# Patient Record
Sex: MALE | Race: ASIAN | NOT HISPANIC OR LATINO | URBAN - METROPOLITAN AREA
[De-identification: names, ages, dates, MRNs, and addresses within clinical notes are randomized per-mention and may not be internally consistent; named-entity substitution may affect disease eponyms.]

---

## 2022-07-28 VITALS
HEART RATE: 50 BPM | DIASTOLIC BLOOD PRESSURE: 81 MMHG | OXYGEN SATURATION: 97 % | WEIGHT: 179.9 LBS | SYSTOLIC BLOOD PRESSURE: 130 MMHG | HEIGHT: 67 IN | RESPIRATION RATE: 16 BRPM | TEMPERATURE: 98 F

## 2022-07-28 RX ORDER — CHLORHEXIDINE GLUCONATE 213 G/1000ML
1 SOLUTION TOPICAL ONCE
Refills: 0 | Status: DISCONTINUED | OUTPATIENT
Start: 2022-08-03 | End: 2022-08-17

## 2022-07-28 NOTE — H&P ADULT - ASSESSMENT
62Y M, __ speaking, former smoker, w/ PMHx HTN, HLD, CAD s/p PTCA pLAD 5/14, SSS w/ good chronotropic response (no PPM) and PVD,  presents to [facility] for recommended cardiac cath w/ possible intervention if clinically indicated, in light of pts risk factors, CCS class III anginal symptoms and abnormal NST.    -ASA 3, Mallampati 3  -Pt compliant w/ home ASA 81mg, last dose ____. Load w/ Plavix 600mg prior to cath  -Pre cath IVF Hydration: NS 250cc bolus then c/w maintenance NS @ 75cc/hr  -Pre cath consented    Risks & benefits of procedure and alternative therapy have been explained to the patient including but not limited to: allergic reaction, bleeding w/possible need for blood transfusion, infection, renal and vascular compromise, limb damage, arrhythmia, stroke, vessel dissection/perforation, Myocardial infarction, emergent CABG. Informed consent obtained and in chart.  62Y M, former smoker, w/ PMHx HTN, HLD, CAD s/p PTCA pLAD 5/14, SSS w/ good chronotropic response (no PPM) and PVD,  presents to [facility] for recommended cardiac cath w/ possible intervention if clinically indicated, in light of pts risk factors, CCS class III anginal symptoms and abnormal NST.    -ASA 3, Mallampati 3  -Pt compliant w/ home ASA 81mg, last dose yesterday. Will give ASA 81mg and load w/ Plavix 600mg prior to cath  -Pre cath IVF Hydration: NS 250cc bolus then c/w maintenance NS @ 75cc/hr  -Pre cath consented    Risks & benefits of procedure and alternative therapy have been explained to the patient including but not limited to: allergic reaction, bleeding w/possible need for blood transfusion, infection, renal and vascular compromise, limb damage, arrhythmia, stroke, vessel dissection/perforation, Myocardial infarction, emergent CABG. Informed consent obtained and in chart.

## 2022-07-28 NOTE — H&P ADULT - NSHPLABSRESULTS_GEN_ALL_CORE
15.5   4.80  )-----------( 186      ( 03 Aug 2022 08:08 )             46.4       08-03    141  |  104  |  18  ----------------------------<  100<H>  4.0   |  26  |  1.22    Ca    9.4      03 Aug 2022 08:01    TPro  8.0  /  Alb  5.0  /  TBili  0.7  /  DBili  x   /  AST  34  /  ALT  34  /  AlkPhos  67  08-03      PT/INR - ( 03 Aug 2022 08:08 )   PT: 11.7 sec;   INR: 0.98          PTT - ( 03 Aug 2022 08:08 )  PTT:31.6 sec    CARDIAC MARKERS ( 03 Aug 2022 08:01 )  x     / x     / 127 U/L / x     / 2.6 ng/mL

## 2022-07-28 NOTE — H&P ADULT - HISTORY OF PRESENT ILLNESS
[facility]     Cardiologist: Dr. Juárez   Escort: To be determined on arrival   Pharmacy:   COVID-19:     CONFIRM MEDICATIONS UPON ARRIVAL     Patient is a 61y/o  M, former smoker, with PMHx of HTN, HLD, CAD (s/p PTCA 90% pLAD 5/14), sick sinus syndrome (no ppm - has good chronotropic response as per MD note), bilateral PVD with claudication, who presented to his cardiologist, Dr. Xie, with complaints of intermittent, non-radiating, substernal chest pain, that has been intermittent, but gradually worsening over the past few weeks, occurring with moderate exertion (walking less than 2 blocks or climbing less than 1 flight of stairs), and alleviated with rest. Patient denies SOB, palpitations, fatigue, dizziness, headache, chills, orthopnea, LE edema, cough, PND, recent travel, or sick contacts. ECHO 04/14/22: Mildly dilated LA. Normal LV/RV/RA. Grade I DD. Trace to mild MR/Tr. EF: 60%.   NST 07/17/22: Abnormal myocardial perfusion imaging, showing a small amount of ischemia in the inferior location, which was reversible in the rest images.     In light of patient's risk factors, CCS Class III anginal symptoms, and abnormal NST, patient presents for left heart cardiac catheterization with possible intervention if clinically indicated.  COVID: negative 7/31/22 (in chart)  Escort:  Pharmacy:     62Y M, __ speaking, former smoker, w/ PMHx HTN, HLD, CAD s/p PTCA pLAD 5/14, SSS w/ good chronotropic response (no PPM) and PVD, initially presented to cardiologist c/o intermittent, non-radiating, substernal CP, occurring w/ mod exertion and alleviated with rest, but gradually worsening over the past few weeks, He denies SOB, palpitations, fatigue, dizziness, headache, chills, orthopnea, LE edema, cough, PND, recent travel, or sick contacts.    TTE 4/14/22: LVEF 60%, G1DD, Mildly dilated LA, trace-mild MR/TR.  NST 7/17/22: Abnormal myocardial perfusion imaging, showing a small amount of ischemia in the inferior location, which was reversible in the rest images.     In light of patient's risk factors, CCS Class III anginal symptoms, and abnormal NST, patient presents for left heart cardiac catheterization with possible intervention if clinically indicated.  COVID: negative 7/31/22 (in chart)  Escort: [facility]  Pharmacy:     62Y M, former smoker, w/ PMHx HTN, HLD, CAD s/p PTCA pLAD 5/14, SSS w/ good chronotropic response (no PPM) and PVD, initially presented to cardiologist c/o intermittent, non-radiating, substernal CP, occurring w/ mod exertion and alleviated with rest, but gradually worsening over the past few weeks, He denies SOB, palpitations, fatigue, dizziness, headache, chills, orthopnea, LE edema, cough, PND, recent travel, or sick contacts.    TTE 4/14/22: LVEF 60%, G1DD, Mildly dilated LA, trace-mild MR/TR.  NST 7/17/22: Abnormal myocardial perfusion imaging, showing a small amount of ischemia in the inferior location, which was reversible in the rest images.     In light of patient's risk factors, CCS Class III anginal symptoms, and abnormal NST, patient presents for left heart cardiac catheterization with possible intervention if clinically indicated.

## 2022-07-28 NOTE — H&P ADULT - NSICDXPASTMEDICALHX_GEN_ALL_CORE_FT
PAST MEDICAL HISTORY:  CAD (coronary artery disease)     H/O sick sinus syndrome     Hyperlipidemia     Hypertension

## 2022-08-03 ENCOUNTER — OUTPATIENT (OUTPATIENT)
Dept: OUTPATIENT SERVICES | Facility: HOSPITAL | Age: 62
LOS: 1 days | Discharge: HOME | End: 2022-08-03
Payer: COMMERCIAL

## 2022-08-03 LAB
A1C WITH ESTIMATED AVERAGE GLUCOSE RESULT: 5.3 % — SIGNIFICANT CHANGE UP (ref 4–5.6)
ALBUMIN SERPL ELPH-MCNC: 5 G/DL — SIGNIFICANT CHANGE UP (ref 3.3–5)
ALP SERPL-CCNC: 67 U/L — SIGNIFICANT CHANGE UP (ref 40–120)
ALT FLD-CCNC: 34 U/L — SIGNIFICANT CHANGE UP (ref 10–45)
ANION GAP SERPL CALC-SCNC: 11 MMOL/L — SIGNIFICANT CHANGE UP (ref 5–17)
APTT BLD: 31.6 SEC — SIGNIFICANT CHANGE UP (ref 27.5–35.5)
AST SERPL-CCNC: 34 U/L — SIGNIFICANT CHANGE UP (ref 10–40)
BASOPHILS # BLD AUTO: 0.06 K/UL — SIGNIFICANT CHANGE UP (ref 0–0.2)
BASOPHILS NFR BLD AUTO: 1.3 % — SIGNIFICANT CHANGE UP (ref 0–2)
BILIRUB SERPL-MCNC: 0.7 MG/DL — SIGNIFICANT CHANGE UP (ref 0.2–1.2)
BUN SERPL-MCNC: 18 MG/DL — SIGNIFICANT CHANGE UP (ref 7–23)
CALCIUM SERPL-MCNC: 9.4 MG/DL — SIGNIFICANT CHANGE UP (ref 8.4–10.5)
CHLORIDE SERPL-SCNC: 104 MMOL/L — SIGNIFICANT CHANGE UP (ref 96–108)
CHOLEST SERPL-MCNC: 134 MG/DL — SIGNIFICANT CHANGE UP
CK MB CFR SERPL CALC: 2.6 NG/ML — SIGNIFICANT CHANGE UP (ref 0–6.7)
CK SERPL-CCNC: 127 U/L — SIGNIFICANT CHANGE UP (ref 30–200)
CO2 SERPL-SCNC: 26 MMOL/L — SIGNIFICANT CHANGE UP (ref 22–31)
CREAT SERPL-MCNC: 1.22 MG/DL — SIGNIFICANT CHANGE UP (ref 0.5–1.3)
EGFR: 67 ML/MIN/1.73M2 — SIGNIFICANT CHANGE UP
EOSINOPHIL # BLD AUTO: 0.16 K/UL — SIGNIFICANT CHANGE UP (ref 0–0.5)
EOSINOPHIL NFR BLD AUTO: 3.3 % — SIGNIFICANT CHANGE UP (ref 0–6)
ESTIMATED AVERAGE GLUCOSE: 105 MG/DL — SIGNIFICANT CHANGE UP (ref 68–114)
GLUCOSE SERPL-MCNC: 100 MG/DL — HIGH (ref 70–99)
HCT VFR BLD CALC: 46.4 % — SIGNIFICANT CHANGE UP (ref 39–50)
HDLC SERPL-MCNC: 53 MG/DL — SIGNIFICANT CHANGE UP
HGB BLD-MCNC: 15.5 G/DL — SIGNIFICANT CHANGE UP (ref 13–17)
IMM GRANULOCYTES NFR BLD AUTO: 0.2 % — SIGNIFICANT CHANGE UP (ref 0–1.5)
INR BLD: 0.98 — SIGNIFICANT CHANGE UP (ref 0.88–1.16)
LIPID PNL WITH DIRECT LDL SERPL: 65 MG/DL — SIGNIFICANT CHANGE UP
LYMPHOCYTES # BLD AUTO: 1.28 K/UL — SIGNIFICANT CHANGE UP (ref 1–3.3)
LYMPHOCYTES # BLD AUTO: 26.7 % — SIGNIFICANT CHANGE UP (ref 13–44)
MCHC RBC-ENTMCNC: 30.9 PG — SIGNIFICANT CHANGE UP (ref 27–34)
MCHC RBC-ENTMCNC: 33.4 GM/DL — SIGNIFICANT CHANGE UP (ref 32–36)
MCV RBC AUTO: 92.6 FL — SIGNIFICANT CHANGE UP (ref 80–100)
MONOCYTES # BLD AUTO: 0.62 K/UL — SIGNIFICANT CHANGE UP (ref 0–0.9)
MONOCYTES NFR BLD AUTO: 12.9 % — SIGNIFICANT CHANGE UP (ref 2–14)
NEUTROPHILS # BLD AUTO: 2.67 K/UL — SIGNIFICANT CHANGE UP (ref 1.8–7.4)
NEUTROPHILS NFR BLD AUTO: 55.6 % — SIGNIFICANT CHANGE UP (ref 43–77)
NON HDL CHOLESTEROL: 81 MG/DL — SIGNIFICANT CHANGE UP
NRBC # BLD: 0 /100 WBCS — SIGNIFICANT CHANGE UP (ref 0–0)
PLATELET # BLD AUTO: 186 K/UL — SIGNIFICANT CHANGE UP (ref 150–400)
POTASSIUM SERPL-MCNC: 4 MMOL/L — SIGNIFICANT CHANGE UP (ref 3.5–5.3)
POTASSIUM SERPL-SCNC: 4 MMOL/L — SIGNIFICANT CHANGE UP (ref 3.5–5.3)
PROT SERPL-MCNC: 8 G/DL — SIGNIFICANT CHANGE UP (ref 6–8.3)
PROTHROM AB SERPL-ACNC: 11.7 SEC — SIGNIFICANT CHANGE UP (ref 10.5–13.4)
RBC # BLD: 5.01 M/UL — SIGNIFICANT CHANGE UP (ref 4.2–5.8)
RBC # FLD: 14.1 % — SIGNIFICANT CHANGE UP (ref 10.3–14.5)
SODIUM SERPL-SCNC: 141 MMOL/L — SIGNIFICANT CHANGE UP (ref 135–145)
TRIGL SERPL-MCNC: 80 MG/DL — SIGNIFICANT CHANGE UP
WBC # BLD: 4.8 K/UL — SIGNIFICANT CHANGE UP (ref 3.8–10.5)
WBC # FLD AUTO: 4.8 K/UL — SIGNIFICANT CHANGE UP (ref 3.8–10.5)

## 2022-08-03 PROCEDURE — 80061 LIPID PANEL: CPT

## 2022-08-03 PROCEDURE — 85610 PROTHROMBIN TIME: CPT

## 2022-08-03 PROCEDURE — 99152 MOD SED SAME PHYS/QHP 5/>YRS: CPT

## 2022-08-03 PROCEDURE — 85025 COMPLETE CBC W/AUTO DIFF WBC: CPT

## 2022-08-03 PROCEDURE — 85730 THROMBOPLASTIN TIME PARTIAL: CPT

## 2022-08-03 PROCEDURE — 82553 CREATINE MB FRACTION: CPT

## 2022-08-03 PROCEDURE — C1887: CPT

## 2022-08-03 PROCEDURE — 93458 L HRT ARTERY/VENTRICLE ANGIO: CPT

## 2022-08-03 PROCEDURE — C1769: CPT

## 2022-08-03 PROCEDURE — 80053 COMPREHEN METABOLIC PANEL: CPT

## 2022-08-03 PROCEDURE — C1894: CPT

## 2022-08-03 PROCEDURE — 82550 ASSAY OF CK (CPK): CPT

## 2022-08-03 PROCEDURE — 93458 L HRT ARTERY/VENTRICLE ANGIO: CPT | Mod: 26

## 2022-08-03 PROCEDURE — 36415 COLL VENOUS BLD VENIPUNCTURE: CPT

## 2022-08-03 PROCEDURE — 83036 HEMOGLOBIN GLYCOSYLATED A1C: CPT

## 2022-08-03 RX ORDER — SODIUM CHLORIDE 9 MG/ML
500 INJECTION INTRAMUSCULAR; INTRAVENOUS; SUBCUTANEOUS
Refills: 0 | Status: DISCONTINUED | OUTPATIENT
Start: 2022-08-03 | End: 2022-08-17

## 2022-08-03 RX ORDER — ASPIRIN/CALCIUM CARB/MAGNESIUM 324 MG
1 TABLET ORAL
Qty: 0 | Refills: 0 | DISCHARGE

## 2022-08-03 RX ORDER — SODIUM CHLORIDE 9 MG/ML
250 INJECTION INTRAMUSCULAR; INTRAVENOUS; SUBCUTANEOUS ONCE
Refills: 0 | Status: COMPLETED | OUTPATIENT
Start: 2022-08-03 | End: 2022-08-03

## 2022-08-03 RX ORDER — TAMSULOSIN HYDROCHLORIDE 0.4 MG/1
1 CAPSULE ORAL
Qty: 0 | Refills: 0 | DISCHARGE

## 2022-08-03 RX ORDER — ATORVASTATIN CALCIUM 80 MG/1
1 TABLET, FILM COATED ORAL
Qty: 0 | Refills: 0 | DISCHARGE

## 2022-08-03 RX ORDER — CLOPIDOGREL BISULFATE 75 MG/1
600 TABLET, FILM COATED ORAL ONCE
Refills: 0 | Status: COMPLETED | OUTPATIENT
Start: 2022-08-03 | End: 2022-08-03

## 2022-08-03 RX ORDER — ASPIRIN/CALCIUM CARB/MAGNESIUM 324 MG
81 TABLET ORAL ONCE
Refills: 0 | Status: COMPLETED | OUTPATIENT
Start: 2022-08-03 | End: 2022-08-03

## 2022-08-03 RX ORDER — ERGOCALCIFEROL 1.25 MG/1
1 CAPSULE ORAL
Qty: 0 | Refills: 0 | DISCHARGE

## 2022-08-03 RX ORDER — ALLOPURINOL 300 MG
1 TABLET ORAL
Qty: 0 | Refills: 0 | DISCHARGE

## 2022-08-03 RX ADMIN — SODIUM CHLORIDE 244 MILLILITER(S): 9 INJECTION INTRAMUSCULAR; INTRAVENOUS; SUBCUTANEOUS at 13:18

## 2022-08-03 RX ADMIN — SODIUM CHLORIDE 75 MILLILITER(S): 9 INJECTION INTRAMUSCULAR; INTRAVENOUS; SUBCUTANEOUS at 08:51

## 2022-08-03 RX ADMIN — Medication 81 MILLIGRAM(S): at 08:52

## 2022-08-03 RX ADMIN — CLOPIDOGREL BISULFATE 600 MILLIGRAM(S): 75 TABLET, FILM COATED ORAL at 08:52

## 2022-08-03 RX ADMIN — SODIUM CHLORIDE 500 MILLILITER(S): 9 INJECTION INTRAMUSCULAR; INTRAVENOUS; SUBCUTANEOUS at 08:53

## 2022-08-03 NOTE — PROGRESS NOTE ADULT - SUBJECTIVE AND OBJECTIVE BOX
Interventional Cardiology PA SDA Discharge Note    Patient without complaints. Ambulated and voided without difficulties    Afebrile, VSS    Ext: Right Radial: no hematoma, no bleeding, dressing; C/D/I      Pulses:    intact RAD back to baseline     A/P:    62Y M, former smoker, w/ PMHx of  HTN, HLD, CAD s/p PTCA pLAD 5/14, SSS w/ good chronotropic response (no PPM) and PVD, initially presented to cardiologist, Dr. Candelaria c/o intermittent, non-radiating, substernal CP, occurring w/ mod exertion and alleviated with rest, but gradually worsening over the past few weeks, He denies SOB, palpitations, fatigue, dizziness, headache, chills, orthopnea, LE edema, cough, PND, recent travel, or sick contacts. TTE 4/14/22: LVEF 60%, G1DD, Mildly dilated LA, trace-mild MR/TR. NST 7/17/22: Abnormal myocardial perfusion imaging, showing a small amount of ischemia in the inferior location, which was reversible in the rest images. In light of patient's risk factors, CCS Class III anginal symptoms, and abnormal NST, patient presents for left heart cardiac catheterization with possible intervention if clinically indicated.     Pt s/p cardiac cath 8/3/2022 Non-obstructive CAD, RCA anomalous take off from the [facility] w/ a sperate ostium as the LM. Mild LI, LM LI, pLAD widely patent stent, D1 LI, LCx LI, Ramus LI. EDP 7. Access site R radial. TR band removed with no complications, no hematoma or bleeding. Pt have sick sinus syndrome, HR went down to 34. Instructed to follow up closely with his outpatient cardiologist and no BB.             1.	Stable for discharge as per attending Dr. Vega after bed rest, pt voids, groin/wrist stable and 30 minutes of ambulation.  2.	Follow-up with Cardiologist, Dr. Candelaria in 1 weeks and to follow up closely regarding to SSS.  3.	Discharged forms signed and copies in chart

## 2022-08-09 DIAGNOSIS — I25.110 ATHEROSCLEROTIC HEART DISEASE OF NATIVE CORONARY ARTERY WITH UNSTABLE ANGINA PECTORIS: ICD-10-CM

## 2022-08-09 DIAGNOSIS — I49.5 SICK SINUS SYNDROME: ICD-10-CM

## 2022-08-09 DIAGNOSIS — R94.39 ABNORMAL RESULT OF OTHER CARDIOVASCULAR FUNCTION STUDY: ICD-10-CM

## 2022-08-09 DIAGNOSIS — Z95.5 PRESENCE OF CORONARY ANGIOPLASTY IMPLANT AND GRAFT: ICD-10-CM

## 2025-01-02 ENCOUNTER — NON-APPOINTMENT (OUTPATIENT)
Age: 65
End: 2025-01-02

## 2025-01-02 ENCOUNTER — APPOINTMENT (OUTPATIENT)
Age: 65
End: 2025-01-02
Payer: COMMERCIAL

## 2025-01-02 PROCEDURE — 92004 COMPRE OPH EXAM NEW PT 1/>: CPT
